# Patient Record
Sex: MALE | Race: NATIVE HAWAIIAN OR OTHER PACIFIC ISLANDER
[De-identification: names, ages, dates, MRNs, and addresses within clinical notes are randomized per-mention and may not be internally consistent; named-entity substitution may affect disease eponyms.]

---

## 2017-06-21 ENCOUNTER — HOSPITAL ENCOUNTER (INPATIENT)
Dept: HOSPITAL 67 - ED | Age: 72
LOS: 7 days | Discharge: HOME | DRG: 948 | End: 2017-06-28
Attending: INTERNAL MEDICINE | Admitting: EMERGENCY MEDICINE
Payer: OTHER GOVERNMENT

## 2017-06-21 VITALS — DIASTOLIC BLOOD PRESSURE: 66 MMHG | SYSTOLIC BLOOD PRESSURE: 158 MMHG | TEMPERATURE: 101.7 F

## 2017-06-21 VITALS — BODY MASS INDEX: 31.82 KG/M2 | HEIGHT: 71 IN | BODY MASS INDEX: 31.82 KG/M2 | WEIGHT: 227.31 LBS

## 2017-06-21 VITALS — TEMPERATURE: 100.1 F | SYSTOLIC BLOOD PRESSURE: 135 MMHG | DIASTOLIC BLOOD PRESSURE: 50 MMHG

## 2017-06-21 DIAGNOSIS — D50.8: ICD-10-CM

## 2017-06-21 DIAGNOSIS — R50.9: ICD-10-CM

## 2017-06-21 DIAGNOSIS — E86.0: ICD-10-CM

## 2017-06-21 DIAGNOSIS — E88.09: ICD-10-CM

## 2017-06-21 DIAGNOSIS — R41.82: Primary | ICD-10-CM

## 2017-06-21 LAB
PLATELET # BLD: 272 K/UL (ref 142–355)
POTASSIUM SERPL-SCNC: 4.5 MMOL/L (ref 3.6–5.2)
SODIUM SERPL-SCNC: 130 MMOL/L (ref 136–145)

## 2017-06-21 PROCEDURE — 81000 URINALYSIS NONAUTO W/SCOPE: CPT

## 2017-06-21 PROCEDURE — 85610 PROTHROMBIN TIME: CPT

## 2017-06-21 PROCEDURE — 36415 COLL VENOUS BLD VENIPUNCTURE: CPT

## 2017-06-21 PROCEDURE — 93005 ELECTROCARDIOGRAM TRACING: CPT

## 2017-06-21 PROCEDURE — 96374 THER/PROPH/DIAG INJ IV PUSH: CPT

## 2017-06-21 PROCEDURE — 96365 THER/PROPH/DIAG IV INF INIT: CPT

## 2017-06-21 PROCEDURE — 84484 ASSAY OF TROPONIN QUANT: CPT

## 2017-06-21 PROCEDURE — 82948 REAGENT STRIP/BLOOD GLUCOSE: CPT

## 2017-06-21 PROCEDURE — 85730 THROMBOPLASTIN TIME PARTIAL: CPT

## 2017-06-21 PROCEDURE — 80048 BASIC METABOLIC PNL TOTAL CA: CPT

## 2017-06-21 PROCEDURE — 83690 ASSAY OF LIPASE: CPT

## 2017-06-21 PROCEDURE — 85651 RBC SED RATE NONAUTOMATED: CPT

## 2017-06-21 PROCEDURE — 83735 ASSAY OF MAGNESIUM: CPT

## 2017-06-21 PROCEDURE — 80053 COMPREHEN METABOLIC PANEL: CPT

## 2017-06-21 PROCEDURE — 93306 TTE W/DOPPLER COMPLETE: CPT

## 2017-06-21 PROCEDURE — 96366 THER/PROPH/DIAG IV INF ADDON: CPT

## 2017-06-21 PROCEDURE — 86140 C-REACTIVE PROTEIN: CPT

## 2017-06-21 PROCEDURE — 84100 ASSAY OF PHOSPHORUS: CPT

## 2017-06-21 PROCEDURE — 85027 COMPLETE CBC AUTOMATED: CPT

## 2017-06-21 PROCEDURE — 96372 THER/PROPH/DIAG INJ SC/IM: CPT

## 2017-06-21 PROCEDURE — 87040 BLOOD CULTURE FOR BACTERIA: CPT

## 2017-06-21 PROCEDURE — 82550 ASSAY OF CK (CPK): CPT

## 2017-06-21 PROCEDURE — 99284 EMERGENCY DEPT VISIT MOD MDM: CPT

## 2017-06-21 PROCEDURE — 82553 CREATINE MB FRACTION: CPT

## 2017-06-22 VITALS — SYSTOLIC BLOOD PRESSURE: 152 MMHG | DIASTOLIC BLOOD PRESSURE: 46 MMHG | TEMPERATURE: 98 F

## 2017-06-22 VITALS — DIASTOLIC BLOOD PRESSURE: 63 MMHG | SYSTOLIC BLOOD PRESSURE: 167 MMHG | TEMPERATURE: 100.2 F

## 2017-06-22 VITALS — TEMPERATURE: 99.5 F | DIASTOLIC BLOOD PRESSURE: 60 MMHG | SYSTOLIC BLOOD PRESSURE: 140 MMHG

## 2017-06-22 VITALS — SYSTOLIC BLOOD PRESSURE: 162 MMHG | TEMPERATURE: 102 F | DIASTOLIC BLOOD PRESSURE: 58 MMHG

## 2017-06-22 VITALS — SYSTOLIC BLOOD PRESSURE: 129 MMHG | DIASTOLIC BLOOD PRESSURE: 59 MMHG | TEMPERATURE: 98.8 F

## 2017-06-22 VITALS — DIASTOLIC BLOOD PRESSURE: 50 MMHG | SYSTOLIC BLOOD PRESSURE: 155 MMHG | TEMPERATURE: 100.1 F

## 2017-06-22 VITALS — SYSTOLIC BLOOD PRESSURE: 143 MMHG | TEMPERATURE: 98.5 F | DIASTOLIC BLOOD PRESSURE: 56 MMHG

## 2017-06-22 LAB
APTT BLD: 23.6 SECONDS (ref 24.5–33.6)
PLATELET # BLD: 255 K/UL (ref 142–355)
POTASSIUM SERPL-SCNC: 3.9 MMOL/L (ref 3.6–5.2)
SODIUM SERPL-SCNC: 129 MMOL/L (ref 136–145)

## 2017-06-22 NOTE — NUR
SPOKE WITH SON FOR HE OR PT'S WIFE TO BRING MEDICATION BOTTLES FOR ACCURATE
INFORMATION R/T HOME MEDS.

## 2017-06-23 VITALS — TEMPERATURE: 100.5 F | DIASTOLIC BLOOD PRESSURE: 73 MMHG | SYSTOLIC BLOOD PRESSURE: 161 MMHG

## 2017-06-23 VITALS — DIASTOLIC BLOOD PRESSURE: 57 MMHG | TEMPERATURE: 100 F | SYSTOLIC BLOOD PRESSURE: 144 MMHG

## 2017-06-23 VITALS — TEMPERATURE: 98.8 F | DIASTOLIC BLOOD PRESSURE: 68 MMHG | SYSTOLIC BLOOD PRESSURE: 167 MMHG

## 2017-06-23 VITALS — TEMPERATURE: 99.6 F | SYSTOLIC BLOOD PRESSURE: 160 MMHG | DIASTOLIC BLOOD PRESSURE: 72 MMHG

## 2017-06-23 VITALS — TEMPERATURE: 99.5 F | DIASTOLIC BLOOD PRESSURE: 80 MMHG | SYSTOLIC BLOOD PRESSURE: 128 MMHG

## 2017-06-23 VITALS — SYSTOLIC BLOOD PRESSURE: 158 MMHG | TEMPERATURE: 101.2 F | DIASTOLIC BLOOD PRESSURE: 66 MMHG

## 2017-06-23 LAB
PLATELET # BLD: 236 K/UL (ref 142–355)
POTASSIUM SERPL-SCNC: 3.9 MMOL/L (ref 3.6–5.2)
SODIUM SERPL-SCNC: 133 MMOL/L (ref 136–145)

## 2017-06-24 VITALS — DIASTOLIC BLOOD PRESSURE: 57 MMHG | SYSTOLIC BLOOD PRESSURE: 142 MMHG | TEMPERATURE: 98 F

## 2017-06-24 VITALS — TEMPERATURE: 99.7 F | DIASTOLIC BLOOD PRESSURE: 57 MMHG | SYSTOLIC BLOOD PRESSURE: 138 MMHG

## 2017-06-24 VITALS — TEMPERATURE: 99.9 F | DIASTOLIC BLOOD PRESSURE: 75 MMHG | SYSTOLIC BLOOD PRESSURE: 137 MMHG

## 2017-06-24 VITALS — DIASTOLIC BLOOD PRESSURE: 70 MMHG | TEMPERATURE: 99.9 F | SYSTOLIC BLOOD PRESSURE: 133 MMHG

## 2017-06-24 VITALS — SYSTOLIC BLOOD PRESSURE: 142 MMHG | DIASTOLIC BLOOD PRESSURE: 69 MMHG | TEMPERATURE: 99.3 F

## 2017-06-24 VITALS — SYSTOLIC BLOOD PRESSURE: 113 MMHG | DIASTOLIC BLOOD PRESSURE: 59 MMHG | TEMPERATURE: 99.5 F

## 2017-06-24 LAB
PLATELET # BLD: 206 K/UL (ref 142–355)
POTASSIUM SERPL-SCNC: 3.8 MMOL/L (ref 3.6–5.2)
SODIUM SERPL-SCNC: 137 MMOL/L (ref 136–145)

## 2017-06-25 VITALS — SYSTOLIC BLOOD PRESSURE: 134 MMHG | DIASTOLIC BLOOD PRESSURE: 65 MMHG | TEMPERATURE: 99.1 F

## 2017-06-25 VITALS — TEMPERATURE: 98.5 F | SYSTOLIC BLOOD PRESSURE: 151 MMHG | DIASTOLIC BLOOD PRESSURE: 70 MMHG

## 2017-06-25 VITALS — TEMPERATURE: 97 F | SYSTOLIC BLOOD PRESSURE: 127 MMHG | DIASTOLIC BLOOD PRESSURE: 52 MMHG

## 2017-06-25 VITALS — DIASTOLIC BLOOD PRESSURE: 60 MMHG | SYSTOLIC BLOOD PRESSURE: 148 MMHG | TEMPERATURE: 99.6 F

## 2017-06-25 VITALS — TEMPERATURE: 99 F | SYSTOLIC BLOOD PRESSURE: 148 MMHG | DIASTOLIC BLOOD PRESSURE: 64 MMHG

## 2017-06-25 VITALS — TEMPERATURE: 98.1 F | DIASTOLIC BLOOD PRESSURE: 62 MMHG | SYSTOLIC BLOOD PRESSURE: 155 MMHG

## 2017-06-25 VITALS — DIASTOLIC BLOOD PRESSURE: 80 MMHG | SYSTOLIC BLOOD PRESSURE: 190 MMHG | TEMPERATURE: 96 F

## 2017-06-25 LAB
PLATELET # BLD: 202 K/UL (ref 142–355)
POTASSIUM SERPL-SCNC: 4 MMOL/L (ref 3.6–5.2)
SODIUM SERPL-SCNC: 137 MMOL/L (ref 136–145)

## 2017-06-26 VITALS — DIASTOLIC BLOOD PRESSURE: 55 MMHG | SYSTOLIC BLOOD PRESSURE: 131 MMHG | TEMPERATURE: 99.4 F

## 2017-06-26 VITALS — TEMPERATURE: 99.1 F | DIASTOLIC BLOOD PRESSURE: 70 MMHG | SYSTOLIC BLOOD PRESSURE: 136 MMHG

## 2017-06-26 VITALS — DIASTOLIC BLOOD PRESSURE: 74 MMHG | SYSTOLIC BLOOD PRESSURE: 144 MMHG | TEMPERATURE: 98.4 F

## 2017-06-26 VITALS — SYSTOLIC BLOOD PRESSURE: 138 MMHG | TEMPERATURE: 99.3 F | DIASTOLIC BLOOD PRESSURE: 63 MMHG

## 2017-06-26 VITALS — DIASTOLIC BLOOD PRESSURE: 76 MMHG | TEMPERATURE: 98.9 F | SYSTOLIC BLOOD PRESSURE: 134 MMHG

## 2017-06-26 LAB
PLATELET # BLD: 158 K/UL (ref 142–355)
POTASSIUM SERPL-SCNC: 4.4 MMOL/L (ref 3.6–5.2)
SODIUM SERPL-SCNC: 137 MMOL/L (ref 136–145)

## 2017-06-27 VITALS — TEMPERATURE: 98 F | SYSTOLIC BLOOD PRESSURE: 157 MMHG | DIASTOLIC BLOOD PRESSURE: 84 MMHG

## 2017-06-27 VITALS — TEMPERATURE: 99.1 F | SYSTOLIC BLOOD PRESSURE: 153 MMHG | DIASTOLIC BLOOD PRESSURE: 69 MMHG

## 2017-06-27 VITALS — SYSTOLIC BLOOD PRESSURE: 145 MMHG | TEMPERATURE: 98.1 F | DIASTOLIC BLOOD PRESSURE: 64 MMHG

## 2017-06-27 VITALS — DIASTOLIC BLOOD PRESSURE: 44 MMHG | SYSTOLIC BLOOD PRESSURE: 130 MMHG | TEMPERATURE: 99.7 F

## 2017-06-27 VITALS — SYSTOLIC BLOOD PRESSURE: 117 MMHG | DIASTOLIC BLOOD PRESSURE: 51 MMHG | TEMPERATURE: 98.6 F

## 2017-06-27 VITALS — SYSTOLIC BLOOD PRESSURE: 132 MMHG | TEMPERATURE: 99.2 F | DIASTOLIC BLOOD PRESSURE: 59 MMHG

## 2017-06-27 LAB
PLATELET # BLD: 192 K/UL (ref 142–355)
POTASSIUM SERPL-SCNC: 5.7 MMOL/L (ref 3.6–5.2)
SODIUM SERPL-SCNC: 139 MMOL/L (ref 136–145)

## 2017-06-28 VITALS — TEMPERATURE: 99 F | DIASTOLIC BLOOD PRESSURE: 73 MMHG | SYSTOLIC BLOOD PRESSURE: 128 MMHG

## 2017-06-28 VITALS — DIASTOLIC BLOOD PRESSURE: 72 MMHG | SYSTOLIC BLOOD PRESSURE: 119 MMHG | TEMPERATURE: 98.1 F

## 2017-06-28 VITALS — SYSTOLIC BLOOD PRESSURE: 130 MMHG | DIASTOLIC BLOOD PRESSURE: 52 MMHG | TEMPERATURE: 97.9 F

## 2017-06-28 LAB
PLATELET # BLD: 202 K/UL (ref 142–355)
POTASSIUM SERPL-SCNC: 4.3 MMOL/L (ref 3.6–5.2)
SODIUM SERPL-SCNC: 142 MMOL/L (ref 136–145)

## 2017-06-28 NOTE — NUR
D/C INSTRUCTIONS GIVEN TO PT AND FAMILY AND BOTH VERBALIZE UNDERSTANDING. PT
GETTING DRESSED AND WILL CALL WHEN READY TO BE TAKEN OUT.

## 2018-06-25 ENCOUNTER — HOSPITAL ENCOUNTER (OUTPATIENT)
Dept: HOSPITAL 67 - OR | Age: 73
Discharge: HOME | End: 2018-06-25
Payer: OTHER GOVERNMENT

## 2018-06-25 DIAGNOSIS — H25.812: Primary | ICD-10-CM

## 2018-06-25 PROCEDURE — 66984 XCAPSL CTRC RMVL W/O ECP: CPT

## 2018-06-25 PROCEDURE — 08RK3JZ REPLACEMENT OF LEFT LENS WITH SYNTHETIC SUBSTITUTE, PERCUTANEOUS APPROACH: ICD-10-PCS

## 2018-06-25 PROCEDURE — V2632 POST CHMBR INTRAOCULAR LENS: HCPCS

## 2020-02-15 ENCOUNTER — HOSPITAL ENCOUNTER (EMERGENCY)
Dept: HOSPITAL 67 - ED | Age: 75
Discharge: HOME | End: 2020-02-15
Payer: OTHER GOVERNMENT

## 2020-02-15 VITALS — SYSTOLIC BLOOD PRESSURE: 146 MMHG | TEMPERATURE: 98.3 F | DIASTOLIC BLOOD PRESSURE: 60 MMHG

## 2020-02-15 VITALS — WEIGHT: 225 LBS | HEIGHT: 71 IN | BODY MASS INDEX: 31.5 KG/M2

## 2020-02-15 DIAGNOSIS — J44.9: ICD-10-CM

## 2020-02-15 DIAGNOSIS — J40: Primary | ICD-10-CM

## 2020-02-15 DIAGNOSIS — F17.220: ICD-10-CM

## 2020-02-15 LAB
APTT BLD: 32.7 SECONDS (ref 24.5–33.6)
PLATELET # BLD: 156 K/UL (ref 142–355)
POTASSIUM SERPL-SCNC: 4.4 MMOL/L (ref 3.6–5.2)
SODIUM SERPL-SCNC: 139 MMOL/L (ref 136–145)

## 2020-02-15 PROCEDURE — 82550 ASSAY OF CK (CPK): CPT

## 2020-02-15 PROCEDURE — 94664 DEMO&/EVAL PT USE INHALER: CPT

## 2020-02-15 PROCEDURE — 85610 PROTHROMBIN TIME: CPT

## 2020-02-15 PROCEDURE — 36415 COLL VENOUS BLD VENIPUNCTURE: CPT

## 2020-02-15 PROCEDURE — 96374 THER/PROPH/DIAG INJ IV PUSH: CPT

## 2020-02-15 PROCEDURE — 82805 BLOOD GASES W/O2 SATURATION: CPT

## 2020-02-15 PROCEDURE — 80053 COMPREHEN METABOLIC PANEL: CPT

## 2020-02-15 PROCEDURE — 85730 THROMBOPLASTIN TIME PARTIAL: CPT

## 2020-02-15 PROCEDURE — 87651 STREP A DNA AMP PROBE: CPT

## 2020-02-15 PROCEDURE — 87502 INFLUENZA DNA AMP PROBE: CPT

## 2020-02-15 PROCEDURE — 96375 TX/PRO/DX INJ NEW DRUG ADDON: CPT

## 2020-02-15 PROCEDURE — 83880 ASSAY OF NATRIURETIC PEPTIDE: CPT

## 2020-02-15 PROCEDURE — 85027 COMPLETE CBC AUTOMATED: CPT

## 2020-02-15 PROCEDURE — 84484 ASSAY OF TROPONIN QUANT: CPT

## 2020-02-15 PROCEDURE — 87205 SMEAR GRAM STAIN: CPT

## 2020-02-15 PROCEDURE — 36600 WITHDRAWAL OF ARTERIAL BLOOD: CPT

## 2020-02-15 PROCEDURE — 93005 ELECTROCARDIOGRAM TRACING: CPT

## 2020-02-15 PROCEDURE — 87040 BLOOD CULTURE FOR BACTERIA: CPT

## 2020-02-15 PROCEDURE — 99284 EMERGENCY DEPT VISIT MOD MDM: CPT
